# Patient Record
Sex: MALE | Race: WHITE
[De-identification: names, ages, dates, MRNs, and addresses within clinical notes are randomized per-mention and may not be internally consistent; named-entity substitution may affect disease eponyms.]

---

## 2019-01-31 ENCOUNTER — HOSPITAL ENCOUNTER (EMERGENCY)
Dept: HOSPITAL 74 - JER | Age: 44
LOS: 1 days | Discharge: ANOTHER HOSPITAL | End: 2019-02-01
Payer: COMMERCIAL

## 2019-01-31 VITALS — BODY MASS INDEX: 31.3 KG/M2

## 2019-01-31 DIAGNOSIS — R07.9: Primary | ICD-10-CM

## 2019-01-31 LAB
ALBUMIN SERPL-MCNC: 4 G/DL (ref 3.4–5)
ALP SERPL-CCNC: 41 U/L (ref 45–117)
ALT SERPL-CCNC: 37 U/L (ref 13–61)
ANION GAP SERPL CALC-SCNC: 9 MMOL/L (ref 8–16)
AST SERPL-CCNC: 20 U/L (ref 15–37)
BASOPHILS # BLD: 0.7 % (ref 0–2)
BILIRUB SERPL-MCNC: 0.4 MG/DL (ref 0.2–1)
BUN SERPL-MCNC: 28 MG/DL (ref 7–18)
CALCIUM SERPL-MCNC: 8.9 MG/DL (ref 8.5–10.1)
CHLORIDE SERPL-SCNC: 106 MMOL/L (ref 98–107)
CO2 SERPL-SCNC: 26 MMOL/L (ref 21–32)
CREAT SERPL-MCNC: 1.3 MG/DL (ref 0.55–1.3)
DEPRECATED RDW RBC AUTO: 13.2 % (ref 11.9–15.9)
EOSINOPHIL # BLD: 1.9 % (ref 0–4.5)
GLUCOSE SERPL-MCNC: 102 MG/DL (ref 74–106)
HCT VFR BLD CALC: 42 % (ref 35.4–49)
HGB BLD-MCNC: 14.9 GM/DL (ref 11.7–16.9)
INR BLD: 0.89 (ref 0.83–1.09)
LYMPHOCYTES # BLD: 18.6 % (ref 8–40)
MCH RBC QN AUTO: 35.1 PG (ref 25.7–33.7)
MCHC RBC AUTO-ENTMCNC: 35.4 G/DL (ref 32–35.9)
MCV RBC: 99.1 FL (ref 80–96)
MONOCYTES # BLD AUTO: 8 % (ref 3.8–10.2)
NEUTROPHILS # BLD: 70.8 % (ref 42.8–82.8)
PLATELET # BLD AUTO: 160 K/MM3 (ref 134–434)
PMV BLD: 7.7 FL (ref 7.5–11.1)
POTASSIUM SERPLBLD-SCNC: 4 MMOL/L (ref 3.5–5.1)
PROT SERPL-MCNC: 7.3 G/DL (ref 6.4–8.2)
PT PNL PPP: 10.5 SEC (ref 9.7–13)
RBC # BLD AUTO: 4.24 M/MM3 (ref 4–5.6)
SODIUM SERPL-SCNC: 141 MMOL/L (ref 136–145)
WBC # BLD AUTO: 6.7 K/MM3 (ref 4–10)

## 2019-01-31 PROCEDURE — 3E033NZ INTRODUCTION OF ANALGESICS, HYPNOTICS, SEDATIVES INTO PERIPHERAL VEIN, PERCUTANEOUS APPROACH: ICD-10-PCS

## 2019-01-31 PROCEDURE — 3E0337Z INTRODUCTION OF ELECTROLYTIC AND WATER BALANCE SUBSTANCE INTO PERIPHERAL VEIN, PERCUTANEOUS APPROACH: ICD-10-PCS

## 2019-01-31 NOTE — PDOC
Rapid Medical Evaluation


Chief Complaint: Chest Pain


Medical Evaluation: 


 Allergies











Allergy/AdvReac Type Severity Reaction Status Date / Time


 


No Known Allergies Allergy   Verified 06/29/18 14:49











01/31/19 19:59


I have performed a brief in-person evaluation of this patient. 


The patient presents with a chief complaint of: CP since noon with radiation to 

back, with dizziness/ no cough or SOB


Pertinent physical exam findings: pale, appears  nervous fidgiting, and asking 

multiple questions. 


I have ordered the following: EKG, CXR CBC, CMP, PT/INR, Cardiac profile 


The patient will proceed to the ED for further evaluation.


01/31/19 20:03





01/31/19 20:04








**Discharge Disposition





- Diagnosis


 Chest pain








- Discharge Dispostion


Condition at time of disposition: Stable





- Referrals





- Patient Instructions





- Post Discharge Activity

## 2019-01-31 NOTE — PDOC
History of Present Illness





- General


Chief Complaint: Chest Pain


Stated Complaint: CHEST PAINS


Time Seen by Provider: 01/31/19 20:09


History Source: Patient


Exam Limitations: No Limitations





- History of Present Illness


Initial Comments: 





01/31/19 20:49


Patient is a 43M with history of dilated aortic root (dx on us, followed as 

outpatient) here today complaining of chest pain that radiates to his back. 

Patient describes becoming dizzy and a tingling in his left leg. Patient also 

describes an associated shortness of breath. Denies fevers, chills, nausea, 

vomiting. Denies illicit drug use, states that he took a male enhancement 

supplement. Denies history of blood clots, recent travel, leg swelling.





Past History





- Past Medical History


Allergies/Adverse Reactions: 


 Allergies











Allergy/AdvReac Type Severity Reaction Status Date / Time


 


No Known Allergies Allergy   Verified 01/31/19 20:01











Home Medications: 


Ambulatory Orders





Amoxicillin/Potassium Clav [Augmentin 875-125 Tablet] 1 each PO BID #14 tablet 

07/01/18 








Anemia: No


Asthma: No


Cardiac Disorders: Yes (DILATED AORTIC VALVE)


COPD: No


Diabetes: No


HTN: No


Kidney Stones: No





- Surgical History


Abdominal Surgery: Yes (HERNIA X 4)


Appendectomy: Yes


Orthopedic Surgery: Yes (RT ELBOW SX)





- Immunization History


Td Vaccination: Yes


Immunization Up to Date: Yes





- Suicide/Smoking/Psychosocial Hx


Smoking Status: No


Smoking History: Never smoked


Years of Tobacco Use: 0


Have you smoked in the past 12 months: No


Number of Cigarettes Smoked Daily: 0


Cigars Per Day: 0


Information on smoking cessation initiated: No


Hx Alcohol Use: No


Drug/Substance Use Hx: No


Substance Use Type: Alcohol


Hx Substance Use Treatment: No





**Review of Systems





- Review of Systems


Able to Perform ROS?: Yes


Comments:: 





01/31/19 20:51


GENERAL/CONSTITUTIONAL: No fever or chills. No weakness.


HEAD, EYES, EARS, NOSE AND THROAT: No change in vision. No ear pain or 

discharge. No sore throat.


CARDIOVASCULAR: +chest pain +shortness of breath


RESPIRATORY: No cough, wheezing, or hemoptysis.


GASTROINTESTINAL: No nausea, vomiting, diarrhea or constipation.


GENITOURINARY: No dysuria, frequency, or change in urination.


MUSCULOSKELETAL: No joint or muscle swelling or pain. No neck or back pain.


SKIN: No rash


NEUROLOGIC: No headache, +vertigo, no loss of consciousness, +l leg tingling


ENDOCRINE: No increased thirst. No abnormal weight change


HEMATOLOGIC/LYMPHATIC: No anemia, easy bleeding, or history of blood clots.


ALLERGIC/IMMUNOLOGIC: No hives or skin allergy.











*Physical Exam





- Vital Signs


 Last Vital Signs











Temp Pulse Resp BP Pulse Ox


 


 98.1 F   81   17   141/93   98 


 


 01/31/19 19:57  01/31/19 19:57  01/31/19 19:57  01/31/19 19:57  01/31/19 19:57














- Physical Exam


Comments: 





01/31/19 20:51


GENERAL: Awake, alert, and fully oriented, in no acute distress


HEAD: No signs of trauma, normocephalic, atraumatic


EYES: PERRLA, EOMI, sclera anicteric, conjunctiva clear


ENT: Auricles normal inspection, hearing grossly normal, nares patent, 

oropharynx clear without


exudates. Moist mucosa


NECK: Normal ROM, supple, no lymphadenopathy, JVD, or masses


LUNGS: No distress, speaks full sentences, clear to auscultation bilaterally


HEART: Regular rate and rhythm, normal S1 and S2, no murmurs, rubs or gallops, 

peripheral pulses normal and equal bilaterally.


ABDOMEN: Soft, nontender, normoactive bowel sounds.  No guarding, no rebound.  

No masses


EXTREMITIES: Normal inspection, Normal range of motion, no edema.  No clubbing 

or cyanosis.


NEUROLOGICAL: Cranial nerves II through XII grossly intact.  Normal speech, 

normal gait, no focal sensorimotor deficits


SKIN: Warm, Dry, normal turgor, no rashes or lesions noted.








Moderate Sedation





- Procedure Monitoring


Vital Signs: 


Procedure Monitoring Vital Signs











Temperature  98.1 F   01/31/19 19:57


 


Pulse Rate  81   01/31/19 19:57


 


Respiratory Rate  17   01/31/19 19:57


 


Blood Pressure  141/93   01/31/19 19:57


 


O2 Sat by Pulse Oximetry (%)  98   01/31/19 19:57











ED Treatment Course





- LABORATORY


CBC & Chemistry Diagram: 


 01/31/19 20:07





 01/31/19 20:07





- ADDITIONAL ORDERS


Additional order review: 


 Laboratory  Results











  01/31/19





  20:07


 


Sodium  141


 


Potassium  4.0


 


Chloride  106


 


Carbon Dioxide  26


 


Anion Gap  9


 


BUN  28 H


 


Creatinine  1.3


 


Creat Clearance w eGFR  59.70


 


Random Glucose  102


 


Calcium  8.9


 


Total Bilirubin  0.4


 


AST  20


 


ALT  37


 


Alkaline Phosphatase  41 L


 


Creatine Kinase  280


 


Troponin I  0.02


 


Total Protein  7.3


 


Albumin  4.0








 











  01/31/19





  20:07


 


RBC  4.24


 


MCV  99.1 H


 


MCHC  35.4


 


RDW  13.2


 


MPV  7.7


 


Neutrophils %  70.8


 


Lymphocytes %  18.6


 


Monocytes %  8.0


 


Eosinophils %  1.9


 


Basophils %  0.7














- RADIOLOGY


Radiology Studies Ordered: 














 Category Date Time Status


 


 CHEST CTA [CT] Stat CT Scan  01/31/19 20:17 Ordered














Medical Decision Making





- Medical Decision Making





01/31/19 20:51


Patient is 43M here today with chest pain. Vitals normal and stable. Normal 

initial workup at outside hospital, neuro symptoms, dilated aortic root, pain 

radiating to back. Will do cardiac workup with CTA to evaluate for dissection.





EKG shows normal sinus rhythm with rate of 91. No st elevations/depressions. 

Normal axis. Normal intervals. No t wave abnormalities.


01/31/19 21:58


CBC CMP normal. Trop .02. Pending CTA.


02/01/19 00:07


CTA shows ascending aortic aneurysm of 4.3cm, per patient this is about the 

same size on prior US. 





D/W Dr Lara at [facility], patient will need to be observed. Transfer accepted.





*DC/Admit/Observation/Transfer


Diagnosis at time of Disposition: 


 Chest pain








- Discharge Dispostion


Disposition: TRANSFER ACUTE CARE/OTHER HOSP


Condition at time of disposition: Stable





- Referrals





- Patient Instructions





- Post Discharge Activity





- Transfer to Acute Care Facility


Receiving Facility: [facility].


Accepting Physician:: Marco

## 2019-01-31 NOTE — PDOC
Attending Attestation





- HPI


HPI: 





01/31/19 21:59


The patient is a 43 year old male with a significant past medical history of 

dilated aortic root, who presents to the emergency department today complaining 

of chest pain with radiation to his back, and associated symptoms of dizziness 

and tingling in his L leg. He also notes some SOB. 





The patient denies any recent travel. Denies lower extremity edema and blood 

clots. 


Denies fever, chills, nausea, vomit, diarrhea and constipation.


Denies dysuria, frequency, urgency and hematuria.





Allergies: NKA


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 








<Susana Nicolas - Last Filed: 01/31/19 21:59>





- Resident


Resident Name: Arnoldo Jerome





- ED Attending Attestation


I have performed the following: I have examined & evaluated the patient, The 

case was reviewed & discussed with the resident, I agree w/resident's findings 

& plan, Exceptions are as noted





- Physicial Exam


PE: 





01/31/19 22:37


Agree with exam as documented by resident


Patient denies severe pain but appears stoic





- Medical Decision Making





01/31/19 22:37


Vitals signs wnl but nature of complaint is concerning for acute vascular 

pathology





f/u labs, cta


dispo per clinical course





02/01/19 00:52


CT shows aortic aneurysm with active chest pain





Pt for transfer to [facility]








<Tom Servin - Last Filed: 02/01/19 00:53>





Attestations





- Attestations





01/31/19 21:59





Documentation prepared by Susana Nicolas, acting as medical scribe for Tom Servin MD.





<Susana Nicolas - Last Filed: 01/31/19 21:59>

## 2019-02-01 VITALS — DIASTOLIC BLOOD PRESSURE: 75 MMHG | SYSTOLIC BLOOD PRESSURE: 125 MMHG

## 2019-02-01 VITALS — TEMPERATURE: 97.5 F

## 2019-02-01 VITALS — HEART RATE: 72 BPM

## 2019-02-01 NOTE — EKG
Test Reason : 

Blood Pressure : ***/*** mmHG

Vent. Rate : 091 BPM     Atrial Rate : 091 BPM

   P-R Int : 160 ms          QRS Dur : 106 ms

    QT Int : 350 ms       P-R-T Axes : 050 006 012 degrees

   QTc Int : 430 ms

 

SINUS RHYTHM WITH PREMATURE ATRIAL COMPLEXES

OTHERWISE NORMAL ECG

WHEN COMPARED WITH ECG OF 22-OCT-2013 06:36,

PREMATURE ATRIAL COMPLEXES ARE NOW PRESENT

Confirmed by MEGHA JASON, WILL ([id number]) on 2/1/2019 11:56:31 AM

 

Referred By:             Confirmed By:WILL CLEVELAND MD

## 2019-09-25 ENCOUNTER — HOSPITAL ENCOUNTER (EMERGENCY)
Dept: HOSPITAL 74 - JER | Age: 44
Discharge: HOME | End: 2019-09-25
Payer: COMMERCIAL

## 2019-09-25 VITALS — SYSTOLIC BLOOD PRESSURE: 127 MMHG | HEART RATE: 70 BPM | DIASTOLIC BLOOD PRESSURE: 87 MMHG

## 2019-09-25 VITALS — BODY MASS INDEX: 33.6 KG/M2

## 2019-09-25 VITALS — TEMPERATURE: 97.7 F

## 2019-09-25 DIAGNOSIS — I51.9: ICD-10-CM

## 2019-09-25 DIAGNOSIS — R55: Primary | ICD-10-CM

## 2019-09-25 DIAGNOSIS — M79.10: ICD-10-CM

## 2019-09-25 LAB
ALBUMIN SERPL-MCNC: 3.8 G/DL (ref 3.4–5)
ALP SERPL-CCNC: 32 U/L (ref 45–117)
ALT SERPL-CCNC: 43 U/L (ref 13–61)
ANION GAP SERPL CALC-SCNC: 6 MMOL/L (ref 8–16)
AST SERPL-CCNC: 18 U/L (ref 15–37)
BASOPHILS # BLD: 0.3 % (ref 0–2)
BILIRUB SERPL-MCNC: 0.4 MG/DL (ref 0.2–1)
BUN SERPL-MCNC: 20.3 MG/DL (ref 7–18)
CALCIUM SERPL-MCNC: 9.2 MG/DL (ref 8.5–10.1)
CHLORIDE SERPL-SCNC: 107 MMOL/L (ref 98–107)
CO2 SERPL-SCNC: 27 MMOL/L (ref 21–32)
CREAT SERPL-MCNC: 1.1 MG/DL (ref 0.55–1.3)
DEPRECATED RDW RBC AUTO: 13.7 % (ref 11.9–15.9)
EOSINOPHIL # BLD: 1.1 % (ref 0–4.5)
GLUCOSE SERPL-MCNC: 82 MG/DL (ref 74–106)
HCT VFR BLD CALC: 38.3 % (ref 35.4–49)
HGB BLD-MCNC: 12.9 GM/DL (ref 11.7–16.9)
LYMPHOCYTES # BLD: 16.3 % (ref 8–40)
MCH RBC QN AUTO: 33.5 PG (ref 25.7–33.7)
MCHC RBC AUTO-ENTMCNC: 33.7 G/DL (ref 32–35.9)
MCV RBC: 99.4 FL (ref 80–96)
MONOCYTES # BLD AUTO: 7 % (ref 3.8–10.2)
NEUTROPHILS # BLD: 75.3 % (ref 42.8–82.8)
PLATELET # BLD AUTO: 148 K/MM3 (ref 134–434)
PMV BLD: 8.3 FL (ref 7.5–11.1)
POTASSIUM SERPLBLD-SCNC: 4.1 MMOL/L (ref 3.5–5.1)
PROT SERPL-MCNC: 7 G/DL (ref 6.4–8.2)
RBC # BLD AUTO: 3.86 M/MM3 (ref 4–5.6)
SODIUM SERPL-SCNC: 140 MMOL/L (ref 136–145)
WBC # BLD AUTO: 4.2 K/MM3 (ref 4–10)

## 2019-09-25 PROCEDURE — 3E0337Z INTRODUCTION OF ELECTROLYTIC AND WATER BALANCE SUBSTANCE INTO PERIPHERAL VEIN, PERCUTANEOUS APPROACH: ICD-10-PCS | Performed by: EMERGENCY MEDICINE

## 2019-09-25 NOTE — PDOC
Documentation entered by Desire Allan SCRIBE, acting as scribe for Klaudia Anderson MD.








Klaudia Anderson MD:  This documentation has been prepared by the tonyeJerrell Aiswarya, SCRIBE, under my direction and personally reviewed by me in its 

entirety.  I confirm that the documentation accurately reflects all work, 

treatment, procedures, and medical decision making performed by me.  





History of Present Illness





- General


Chief Complaint: Lightheaded


Stated Complaint: RT. KNEE PAIN/ LT. ARM PAIN


Time Seen by Provider: 19 13:01


History Source: Patient


Exam Limitations: No Limitations





- History of Present Illness


Initial Comments: 





19 13:40


The patient is a 44 year old male, with a significant PMH of dilated aortic 

valve, who presents to the emergency department with lightheadedness and knee 

pain that began this week. Patient states he endorses associated symptoms of 

feeling /90,  flushed, chills, decreased sleep  and headache this 

afternoon. He also notes posterior/anterior knee pain and back pain. The 

patient also mentions he is a  and has left arm pain whenever he 

lifts things at work. The patient denies chest pain, shortness of breath, fever

, chills, nausea, vomit, diarrhea and constipation. Denies any numbness or 

tingling. 


 


 


Allergies: NKDA


Past surgical history: abdominal hernia x4, appendectomy, right elbow 

orthopedic 


Social history: None reported 





19 14:57








Past History





- Past Medical History


Allergies/Adverse Reactions: 


 Allergies











Allergy/AdvReac Type Severity Reaction Status Date / Time


 


No Known Allergies Allergy   Verified 19 12:42











Home Medications: 


Ambulatory Orders





NK [No Known Home Medication]  19 








Anemia: No


Asthma: No


Cardiac Disorders: Yes (DILATED AORTIC VALVE)


COPD: No


Diabetes: No


HTN: No


Kidney Stones: No





- Surgical History


Abdominal Surgery: Yes (HERNIA X 4)


Appendectomy: Yes


Orthopedic Surgery: Yes (RT ELBOW SX)





- Immunization History


Td Vaccination: Yes


Immunization Up to Date: Yes





- Psycho Social/Smoking Cessation Hx


Smoking Status: No


Smoking History: Never smoked


Years of Tobacco Use: 0


Have you smoked in the past 12 months: No


Number of Cigarettes Smoked Daily: 0


Cigars Per Day: 0


Hx Alcohol Use: Yes


Drug/Substance Use Hx: No


Substance Use Type: Alcohol


Hx Substance Use Treatment: No





**Review of Systems





- Review of Systems


Able to Perform ROS?: Yes


Comments:: 





19 13:41


GENERAL/CONSTITUTIONAL: No fever or chills. No weakness.


HEAD, EYES, EARS, NOSE AND THROAT: No change in vision. No ear pain or 

discharge. No sore throat.


CARDIOVASCULAR: No chest pain or shortness of breath.


RESPIRATORY: No cough, wheezing, or hemoptysis.


GASTROINTESTINAL: No nausea, vomiting, diarrhea or constipation.


GENITOURINARY: No dysuria, frequency, or change in urination.


MUSCULOSKELETAL: + left  knee pain. +right elbow pain. No neck or back pain.


SKIN: No rash


NEUROLOGIC: +headache. No vertigo, loss of consciousness, or change in strength/

sensation.


ENDOCRINE: No increased thirst. No abnormal weight change.


HEMATOLOGIC/LYMPHATIC: No anemia, easy bleeding, or history of blood clots.


ALLERGIC/IMMUNOLOGIC: No hives or skin allergy.








*Physical Exam





- Vital Signs


 Last Vital Signs











Temp Pulse Resp BP Pulse Ox


 


 97.7 F   81   16   141/90   97 


 


 19 12:39  19 12:39  19 12:39  19 12:39  19 12:39














- Physical Exam


Comments: 





19 15:00


awake alert lungs clear bilat heart rrr no mrg abd soft nt nd ext wwp no edema. 

no calf tenderness. right knee FROM no laxity. no noted effusion. no swelling. 

neg ant/ post drawer. hip / ankle NT FROM. left arm elbow FROM. mild ttp over 

proximal forearm muscle. no swelling. no erythema. nuero alert oriented x 3. 5/

5 all four ext. 











**Heart Score/ECG Review


  ** #1


ECG reviewed & interpreted by me at: 15:06


General ECG Interpretation: Sinus Rhythm, Normal Rate (72), Normal Intervals, 

No acute ischemic changes


Compared to previous ECG there are: Other (TWI III only.)





ED Treatment Course





- LABORATORY


CBC & Chemistry Diagram: 


 19 13:41





 19 13:41





- ADDITIONAL ORDERS


Additional order review: 


 Laboratory  Results











  19





  13:41


 


Sodium  140


 


Potassium  4.1


 


Chloride  107


 


Carbon Dioxide  27


 


Anion Gap  6 L


 


BUN  20.3 H


 


Creatinine  1.1


 


Est GFR (CKD-EPI)AfAm  92.81


 


Est GFR (CKD-EPI)NonAf  80.08


 


Random Glucose  82


 


Calcium  9.2


 


Total Bilirubin  0.4


 


AST  18


 


ALT  43


 


Alkaline Phosphatase  32 L


 


Creatine Kinase  272


 


Troponin I  < 0.02


 


Total Protein  7.0


 


Albumin  3.8








 











  19





  13:41


 


RBC  3.86 L


 


MCV  99.4 H


 


MCHC  33.7


 


RDW  13.7


 


MPV  8.3


 


Neutrophils %  75.3


 


Lymphocytes %  16.3


 


Monocytes %  7.0


 


Eosinophils %  1.1


 


Basophils %  0.3














- RADIOLOGY


Radiology Studies Ordered: 














 Category Date Time Status


 


 CHEST PA & LAT [RAD] Stat Radiology  19 13:13 Completed


 


 KNEE 3 POS-RIGHT [RAD] Stat Radiology  19 13:13 Completed


 


 DUPLEX VASCUL US-1 LEG [US] Stat Ultrasound  19 13:59 Ordered














- Medications


Given in the ED: 


ED Medications














Discontinued Medications














Generic Name Dose Route Start Last Admin





  Trade Name Freq  PRN Reason Stop Dose Admin


 


Sodium Chloride  1,000 ml  19 13:15  19 13:45





  Normal Saline -  IV  19 13:16  1,000 ml





  ONCE ONE   Administration





     





     





     





     














Medical Decision Making





- Medical Decision Making


Medical Decision Makin yo male with aortic valve insufficiency, works as a , here 

with feeling lightheaded. various muscle complaints. left forearm myalgia, 

bilat leg thigh pain. no f/c no sp no sob. no recent travel. no h/o pe or dvt. 

today was walking to work and felt lightheaded. no loc no syncope no 

palpitations. has been having bilat leg thigh pain for several weeks, more left 

knee.  overall feels run down has been eating and drinking ok.





differential: electrolyte abnoramlity, anemia, mysoitis, lyme, knee bursitis or 

ligamentous injury .


plan xray knee, doppler left leg, cxr  labs ekg iv hydration. will send lyme 

titer also noted his bp was elevated at 159/ 100. has not h/o htn. will order 

labs r/o end organ damage.





lab unremarkable. ekg normal. CXR normal. xray knee normal. awaiting doppler r/

o dvt. if negative will dc home. 


near syncope. will give fu with cardiology.


pt labs are normal. dvt study negative. 





Discharge





- Discharge Information


Problems reviewed: Yes


Clinical Impression/Diagnosis: 


 Near syncope, Myalgia





Disposition: HOME





- Admission


No





- Follow up/Referral


Referrals: 


Kareem Gibbons [Primary Care Provider] - 


Lupillo Georges MD [Staff Physician] - 





- Patient Discharge Instructions


Patient Printed Discharge Instructions:  Fainting


Additional Instructions: 


your labs are normal today. your chest xray is normal. your ultrasound of your 

legs are negative for a DVT. you should follow up with your cardiologist. If 

you do not have one you can call dr Georges. see referral information and 

call to schedule. return for any problems or concerns. you should also follow 

up with dr De Anda for your knee pain. see referral for information and call to 

schedule followup appointment.





- Post Discharge Activity

## 2019-09-26 NOTE — EKG
Test Reason : 

Blood Pressure : ***/*** mmHG

Vent. Rate : 072 BPM     Atrial Rate : 072 BPM

   P-R Int : 170 ms          QRS Dur : 100 ms

    QT Int : 372 ms       P-R-T Axes : 040 -02 -02 degrees

   QTc Int : 407 ms

 

NORMAL SINUS RHYTHM

NORMAL ECG

WHEN COMPARED WITH ECG OF 31-JAN-2019 19:54,

PREMATURE ATRIAL COMPLEXES ARE NO LONGER PRESENT

Confirmed by KAYLENE DOLAN MD ([id number]) on 9/26/2019 2:07:48 PM

 

Referred By:             Confirmed By:KAYLENE DOLAN MD